# Patient Record
Sex: MALE | Race: WHITE | NOT HISPANIC OR LATINO | URBAN - METROPOLITAN AREA
[De-identification: names, ages, dates, MRNs, and addresses within clinical notes are randomized per-mention and may not be internally consistent; named-entity substitution may affect disease eponyms.]

---

## 2017-12-06 ENCOUNTER — APPOINTMENT (OUTPATIENT)
Dept: PULMONOLOGY | Facility: HOSPICE | Age: 72
End: 2017-12-06
Payer: MEDICARE

## 2018-04-13 ENCOUNTER — HOSPITAL ENCOUNTER (OUTPATIENT)
Dept: RADIOLOGY | Facility: MEDICAL CENTER | Age: 73
End: 2018-04-13

## 2018-04-16 ENCOUNTER — OFFICE VISIT (OUTPATIENT)
Dept: PULMONOLOGY | Facility: HOSPICE | Age: 73
End: 2018-04-16
Payer: MEDICARE

## 2018-04-16 VITALS
RESPIRATION RATE: 16 BRPM | DIASTOLIC BLOOD PRESSURE: 82 MMHG | TEMPERATURE: 98.5 F | HEIGHT: 71 IN | OXYGEN SATURATION: 64 % | BODY MASS INDEX: 34.16 KG/M2 | HEART RATE: 91 BPM | WEIGHT: 244 LBS | SYSTOLIC BLOOD PRESSURE: 128 MMHG

## 2018-04-16 DIAGNOSIS — J45.41 MODERATE PERSISTENT ASTHMA WITH ACUTE EXACERBATION: ICD-10-CM

## 2018-04-16 DIAGNOSIS — I50.42 CHF (CONGESTIVE HEART FAILURE), NYHA CLASS I, CHRONIC, COMBINED (HCC): ICD-10-CM

## 2018-04-16 DIAGNOSIS — J43.2 CENTRILOBULAR EMPHYSEMA (HCC): ICD-10-CM

## 2018-04-16 PROCEDURE — 99204 OFFICE O/P NEW MOD 45 MIN: CPT | Mod: 25 | Performed by: INTERNAL MEDICINE

## 2018-04-16 PROCEDURE — G0009 ADMIN PNEUMOCOCCAL VACCINE: HCPCS | Performed by: INTERNAL MEDICINE

## 2018-04-16 PROCEDURE — 94761 N-INVAS EAR/PLS OXIMETRY MLT: CPT | Performed by: INTERNAL MEDICINE

## 2018-04-16 PROCEDURE — 90670 PCV13 VACCINE IM: CPT | Performed by: INTERNAL MEDICINE

## 2018-04-16 RX ORDER — FLUTICASONE PROPIONATE AND SALMETEROL XINAFOATE 115; 21 UG/1; UG/1
2 AEROSOL, METERED RESPIRATORY (INHALATION) 2 TIMES DAILY
Qty: 1 INHALER | Refills: 5 | Status: SHIPPED | OUTPATIENT
Start: 2018-04-16 | End: 2018-11-16 | Stop reason: SDUPTHER

## 2018-04-16 RX ORDER — AMOXICILLIN 500 MG/1
CAPSULE ORAL
COMMUNITY

## 2018-04-16 RX ORDER — CARVEDILOL 12.5 MG/1
12.5 TABLET ORAL 2 TIMES DAILY WITH MEALS
COMMUNITY

## 2018-04-16 RX ORDER — BENAZEPRIL HYDROCHLORIDE 40 MG/1
40 TABLET ORAL DAILY
COMMUNITY

## 2018-04-16 RX ORDER — PIOGLITAZONEHYDROCHLORIDE 15 MG/1
15 TABLET ORAL DAILY
COMMUNITY
Start: 2018-02-19

## 2018-04-16 RX ORDER — ALBUTEROL SULFATE 90 UG/1
AEROSOL, METERED RESPIRATORY (INHALATION)
COMMUNITY
Start: 2018-04-04 | End: 2018-07-16 | Stop reason: SDUPTHER

## 2018-04-16 RX ORDER — FUROSEMIDE 20 MG/1
20 TABLET ORAL DAILY
COMMUNITY
Start: 2018-03-26

## 2018-04-16 RX ORDER — PRAVASTATIN SODIUM 40 MG
40 TABLET ORAL DAILY
COMMUNITY
Start: 2018-02-19

## 2018-04-16 NOTE — PATIENT INSTRUCTIONS
This gentleman comes in for evaluation of significant COPD with underlying heart failure. He is sent by his cardiologist and his primary care doctor.    He lives in [place], tells me that he has daily sputum production, very likely chronic bronchitis. He stopped smoking 14 years ago after accumulating more than a 78-[id number] history.    He presently uses Ventolin, I have added Advair 1:15, 2 puffs in the morning 2 puffs at night and instructed him to rinse and gargle after use. Very thick sputum is not purulent or bloody, he may benefit from a mucus thinner like Mucinex.    He has low oxygen levels, 64% on arrival, we are doing a multi ox today to qualify him for supplemental oxygen.    In addition to providing supplemental oxygen and qualifying, we did discuss pulmonary rehabilitation, but with his heart failure, ankle edema, and living in [place] I don't think he is going to be able to come to town frequently enough to do the rehabilitation and he declines at this time. A chest x-ray November was clear.    He will need Advair maintenance, Ventolin rescue, mucus thinner, gradual improvement in exercise tolerance with supplemental oxygen, room air saturation was 87%, saturation was as low as 64%. He did improve to 90-94% on 2 L walking. We will see him back in 6-8 weeks to determine the benefit of supplemental oxygen 24 7, he may need a portable concentrator, the addition of Advair, and we will administer Prevnar today. Flu vaccine was given last year. Elevated body mass index will be addressed with portion control and very gentle exercise with supplemental oxygen. Health maintenance issues reviewed.

## 2018-04-16 NOTE — PROCEDURES
Multi-Ox Readings  Multi Ox #1 Room air   O2 sat % at rest 87   O2 sat % on exertion     O2 sat average on exertion     Multi Ox #2 2 LPM   O2 sat % at rest 94   O2 sat % on exertion 90   O2 sat average on exertion 92     Oxygen Use     Oxygen Frequency     Duration of need     Is the patient mobile within the home?     CPAP Use?     BIPAP Use?     Servo Titration

## 2018-04-16 NOTE — PROGRESS NOTES
Memo Santos is a 73 y.o. male here for COPD with chronic bronchitis and significant hypoxemia. Patient was referred by his primary care doctor.    History of Present Illness:      This gentleman comes in for evaluation of significant COPD with underlying heart failure. He is sent by his cardiologist and his primary care doctor.    He lives in [place], tells me that he has daily sputum production, very likely chronic bronchitis. He stopped smoking 14 years ago after accumulating more than a [id number]-[id number] history.    He presently uses Ventolin, I have added Advair 1:15, 2 puffs in the morning 2 puffs at night and instructed him to rinse and gargle after use. Very thick sputum is not purulent or bloody, he may benefit from a mucus thinner like Mucinex.    He has low oxygen levels, 64% on arrival, we are doing a multi ox today to qualify him for supplemental oxygen.    In addition to providing supplemental oxygen and qualifying, we did discuss pulmonary rehabilitation, but with his heart failure, ankle edema, and living in [place] I don't think he is going to be able to come to town frequently enough to do the rehabilitation and he declines at this time. A chest x-ray November was clear.    He will need Advair maintenance, Ventolin rescue, mucus thinner, gradual improvement in exercise tolerance with supplemental oxygen, room air saturation was 87%, saturation was as low as 64%. He did improve to 90-94% on 2 L walking. We will see him back in 6-8 weeks to determine the benefit of supplemental oxygen 24 7, he may need a portable concentrator, the addition of Advair, and we will administer Prevnar today. Flu vaccine was given last year. Elevated body mass index will be addressed with portion control and very gentle exercise with supplemental oxygen. Health maintenance issues reviewed.    Constitutional ROS: No unexpected change in weight, No unexplained fevers  Eyes: No change in vision or blurring or double  vision  Mouth/Throat ROS: No sore throat, No recent change in voice or hoarseness  Pulmonary ROS: See present history for pertinent positives  Cardiovascular ROS: No chest pain to suggest acute coronary syndrome  Gastrointestinal ROS: No abdominal pain to suggest peptic disease  Musculoskeletal/Extremities ROS: no acute artritis or unusual swelling  Hematologic/Lymphatic ROS: No easy bleeding or unusual lymph node swelling  Neurologic ROS: No new or unusual weakness  Psychiatric ROS: No hallucinations  Allergic/Immunologic: No  urticaria or allergic rash      Current Outpatient Prescriptions   Medication Sig Dispense Refill   • VENTOLIN  (90 Base) MCG/ACT Aero Soln inhalation aerosol      • furosemide (LASIX) 20 MG Tab      • metformin (GLUCOPHAGE) 1000 MG tablet      • pioglitazone (ACTOS) 15 MG Tab      • pravastatin (PRAVACHOL) 40 MG tablet      • benazepril (LOTENSIN) 40 MG tablet Take 40 mg by mouth every day.     • carvedilol (COREG) 12.5 MG Tab Take 12.5 mg by mouth 2 times a day, with meals.     • Multiple Vitamins-Minerals (OCUVITE EXTRA PO) Take  by mouth.     • fluticasone-salmeterol (ADVAIR HFA) 115-21 MCG/ACT inhaler Inhale 2 Puffs by mouth 2 times a day. Inhalation with spacer. Rinse mouth after each use. 1 Inhaler 5   • amoxicillin (AMOXIL) 500 MG Cap        No current facility-administered medications for this visit.        Social History   Substance Use Topics   • Smoking status: Former Smoker     Packs/day: 2.00     Types: Cigarettes     Quit date: 2004   • Smokeless tobacco: Never Used   • Alcohol use Yes      Comment: very rarely        Past Medical History:   Diagnosis Date   • Bronchitis    • Cardiac arrhythmia    • Chickenpox    • COPD (chronic obstructive pulmonary disease) (CMS-[facility])    • Cough    • Diabetes (CMS-[facility])    • Double vision    • Frequent urination    • [place] measles    • Hearing difficulty    • Hypertension    • Kidney stone    • Mumps    • Obesity    • Prostate cancer  "(CMS-[facility])    • Pulmonary emphysema (CMS-[facility])    • Ringing in ears    • Shortness of breath    • Sore muscles    • Swelling of lower extremity    • Vision loss    • Wheezing        Past Surgical History:   Procedure Laterality Date   • PB REMV 2ND CATARACT,CORN-SCLER SECTN     • TONSILLECTOMY         Allergies: Patient has no allergy information on record.    Family History   Problem Relation Age of Onset   • Heart Failure Mother    • Kidney Disease Father        Physical Examination    Vitals:    04/16/18 1413   Height: 1.791 m (5' 10.5\")   Weight: 110.7 kg (244 lb)   Weight % change since last entry.: 0 %   BP: 128/82   Pulse: 91   BMI (Calculated): 34.52   Resp: 16   Temp: 36.9 °C (98.5 °F)       General Appearance: alert, no distress  Skin: Skin color, texture, turgor normal. No rashes or lesions.  Eyes: negative  Oropharynx: Lips, mucosa, and tongue normal. Teeth and gums normal. Oropharynx moist and without lesion  Lungs: positive findings: Scattered rhonchi with wheezes and expiratory prolongation  Heart: negative. RRR without murmur, gallop, or rubs.  No ectopy.  Abdomen: Abdomen soft, non-tender. . No masses,  No organomegaly  Extremities:  3+ pretibial edema,  skin discoloration  Joints: No acute arthritis  Peripheral Pulses:perfused  Neurologic: intact grossly  No cervical adenopathy    II (soft palate, uvula, fauces visible)    Imaging: Chest x-ray November was clear    PFTS: Lung function testing previously showed mid flows at 13% predicted, flow volume loop consistent with very severe obstruction as was done September 2017      Assessment and Plan  1. Centrilobular emphysema (CMS-[facility])  - AMB MULTIPLE OXIMETRY; Future  - PNEUMOCOCCAL CONJUGATE VACCINE 13-VALENT  - AMB MULTIPLE OXIMETRY    2. Moderate persistent asthma with acute exacerbation  - AMB MULTIPLE OXIMETRY; Future  - AMB MULTIPLE OXIMETRY    3. BMI 34.0-34.9,adult    4. CHF (congestive heart failure), NYHA class I, chronic, combined " (CMS-[facility])      This gentleman comes in for evaluation of significant COPD with underlying heart failure. He is sent by his cardiologist and his primary care doctor.    He lives in [place], tells me that he has daily sputum production, very likely chronic bronchitis. He stopped smoking 14 years ago after accumulating more than a 10-vshi-fhly history.    He presently uses Ventolin, I have added Advair 1:15, 2 puffs in the morning 2 puffs at night and instructed him to rinse and gargle after use. Very thick sputum is not purulent or bloody, he may benefit from a mucus thinner like Mucinex.    He has low oxygen levels, 64% on arrival, we are doing a multi ox today to qualify him for supplemental oxygen.    In addition to providing supplemental oxygen and qualifying, we did discuss pulmonary rehabilitation, but with his heart failure, ankle edema, and living in [place] I don't think he is going to be able to come to town frequently enough to do the rehabilitation and he declines at this time. A chest x-ray November was clear.    He will need Advair maintenance, Ventolin rescue, mucus thinner, gradual improvement in exercise tolerance with supplemental oxygen, room air saturation was 87%, saturation was as low as 64%. He did improve to 90-94% on 2 L walking. We will see him back in 6-8 weeks to determine the benefit of supplemental oxygen 24 7, he may need a portable concentrator, the addition of Advair, and we will administer Prevnar today. Flu vaccine was given last year. Elevated body mass index will be addressed with portion control and very gentle exercise with supplemental oxygen. Health maintenance issues reviewed.  Followup Return in about 6 weeks (around 5/28/2018) for follow up visit with Dr. Livier Cardoso.

## 2018-07-16 ENCOUNTER — OFFICE VISIT (OUTPATIENT)
Dept: PULMONOLOGY | Facility: HOSPICE | Age: 73
End: 2018-07-16
Payer: MEDICARE

## 2018-07-16 VITALS
DIASTOLIC BLOOD PRESSURE: 70 MMHG | TEMPERATURE: 98.6 F | SYSTOLIC BLOOD PRESSURE: 130 MMHG | RESPIRATION RATE: 16 BRPM | HEIGHT: 70 IN | BODY MASS INDEX: 34.93 KG/M2 | HEART RATE: 70 BPM | OXYGEN SATURATION: 91 % | WEIGHT: 244 LBS

## 2018-07-16 DIAGNOSIS — J45.41 MODERATE PERSISTENT ASTHMA WITH ACUTE EXACERBATION: ICD-10-CM

## 2018-07-16 DIAGNOSIS — J43.2 CENTRILOBULAR EMPHYSEMA (HCC): ICD-10-CM

## 2018-07-16 DIAGNOSIS — I50.42 CHF (CONGESTIVE HEART FAILURE), NYHA CLASS I, CHRONIC, COMBINED (HCC): ICD-10-CM

## 2018-07-16 DIAGNOSIS — R09.02 HYPOXIA: ICD-10-CM

## 2018-07-16 PROCEDURE — 99214 OFFICE O/P EST MOD 30 MIN: CPT | Performed by: INTERNAL MEDICINE

## 2018-07-16 RX ORDER — ALBUTEROL SULFATE 90 UG/1
2 AEROSOL, METERED RESPIRATORY (INHALATION) 4 TIMES DAILY
Qty: 8.5 G | Refills: 2 | Status: SHIPPED | OUTPATIENT
Start: 2018-07-16 | End: 2019-01-18 | Stop reason: SDUPTHER

## 2018-07-16 NOTE — LETTER
Livier Cardoso M.D.  [facility]   [address] [place] [address] [place] [address]  Phone: [phone] - Fax: [phone]           Encounter Date: 7/16/2018  Provider: Livier Cardoso M.D.  Location of Care: [facility]      Patient:   Memo Santos   MR Number: [id number]   YOB: 1945     PROGRESS NOTE:  Memo Santos is a 73 y.o. male here for COPD on oxygen with reactive airway disease and elevated body mass index. Patient was referred by his primary care doctor.    History of Present Illness:      This gentleman lives in [place], has had a significant improvement primarily with the oxygen.  3 L pulse with exercise is being utilized and this is strongly endorsed.  He uses it generally 24 7, supine and at night would be very important given his elevated body mass index.  Portion control and gentle exercise are again discussed.    His reactive airway disease is fairly well-controlled but he was using Advair intermittently, I suggested 2 puffs morning 2 puffs night as maintenance, with Ventolin or albuterol or Pro Air rescue inhaler 2 puffs 4-6 times per day as needed for coughing wheezing or chest tightness.  This is all reviewed.    Immunizations are reviewed, he will need Pneumovax at the next visit, flu vaccine in the fall, otherwise is current.  Body mass index at 35, addressed.  Not smoking, endorsed.  We will recheck him in 6 months sooner for problems    Constitutional ROS: No unexpected change in weight, No unexplained fevers  Eyes: No change in vision or blurring or double vision  Mouth/Throat ROS: No sore throat, No recent change in voice or hoarseness  Pulmonary ROS: See present history for pertinent positives  Cardiovascular ROS: No chest pain to suggest acute coronary syndrome  Gastrointestinal ROS: No abdominal pain to suggest peptic disease  Musculoskeletal/Extremities ROS: no acute artritis or unusual  swelling  Hematologic/Lymphatic ROS: No easy bleeding or unusual lymph node swelling  Neurologic ROS: No new or unusual weakness  Psychiatric ROS: No hallucinations  Allergic/Immunologic: No  urticaria or allergic rash      Current Outpatient Prescriptions   Medication Sig Dispense Refill   • Diphenhydramine-APAP, sleep, (TYLENOL PM EXTRA STRENGTH)  MG/30ML Liquid Take  by mouth.     • VENTOLIN  (90 Base) MCG/ACT Aero Soln inhalation aerosol Inhale 2 Puffs by mouth 4 times a day. 8.5 g 2   • furosemide (LASIX) 20 MG Tab      • metformin (GLUCOPHAGE) 1000 MG tablet      • pioglitazone (ACTOS) 15 MG Tab      • pravastatin (PRAVACHOL) 40 MG tablet      • benazepril (LOTENSIN) 40 MG tablet Take 40 mg by mouth every day.     • carvedilol (COREG) 12.5 MG Tab Take 12.5 mg by mouth 2 times a day, with meals.     • Multiple Vitamins-Minerals (OCUVITE EXTRA PO) Take  by mouth.     • amoxicillin (AMOXIL) 500 MG Cap      • fluticasone-salmeterol (ADVAIR HFA) 115-21 MCG/ACT inhaler Inhale 2 Puffs by mouth 2 times a day. Inhalation with spacer. Rinse mouth after each use. 1 Inhaler 5     No current facility-administered medications for this visit.        Social History   Substance Use Topics   • Smoking status: Former Smoker     Packs/day: 2.00     Types: Cigarettes     Quit date: 2004   • Smokeless tobacco: Never Used   • Alcohol use Yes      Comment: very rarely        Past Medical History:   Diagnosis Date   • Bronchitis    • Cardiac arrhythmia    • Chickenpox    • COPD (chronic obstructive pulmonary disease) (HCC)    • Cough    • Diabetes (HCC)    • Double vision    • Frequent urination    • [place] measles    • Hearing difficulty    • Hypertension    • Kidney stone    • Mumps    • Obesity    • Prostate cancer (HCC)    • Pulmonary emphysema (HCC)    • Ringing in ears    • Shortness of breath    • Sore muscles    • Swelling of lower extremity    • Vision loss    • Wheezing        Past Surgical History:   Procedure  "Laterality Date   • PB REMV 2ND CATARACT,CORN-SCLER SECTN     • TONSILLECTOMY         Allergies: Patient has no allergy information on record.    Family History   Problem Relation Age of Onset   • Heart Failure Mother    • Kidney Disease Father        Physical Examination    Vitals:    07/16/18 1004   Height: 1.778 m (5' 10\")   Weight: 110.7 kg (244 lb)   Weight % change since last entry.: 0 %   BP: 130/70   Pulse: 70   BMI (Calculated): 35.01   Resp: 16   Temp: 37 °C (98.6 °F)   O2 sat % on O2: 91 %   O2 Flow Rate (L/min): 2       General Appearance: alert, no distress  Skin: Skin color, texture, turgor normal. No rashes or lesions.  Eyes: negative  Oropharynx: Lips, mucosa, and tongue normal. Teeth and gums normal. Oropharynx moist and without lesion  Lungs: positive findings: Remarkably clear although diminished breath sounds and expiratory wheeze on forced maneuver  Heart: negative. RRR without murmur, gallop, or rubs.  No ectopy.  Abdomen: Abdomen soft, non-tender. . No masses,  No organomegaly  Extremities:  No deformities, does have lower extremity left greater than right chronic trace edema, or skin discoloration  Joints: No acute arthritis  Peripheral Pulses:perfused  Neurologic: intact grossly  On oxygen looks comfortable    II (soft palate, uvula, fauces visible)    Imaging: None presently    PFTS: Prior reviewed      Assessment and Plan  1. Centrilobular emphysema (HCC)    2. Moderate persistent asthma with acute exacerbation    3. BMI 34.0-34.9,adult  - OBESITY COUNSELING (No Charge): Patient identified as having weight management issue.  Appropriate orders and counseling given.    4. CHF (congestive heart failure), NYHA class I, chronic, combined (HCC)    5. Hypoxia  On oxygen 24 7 with 3 L pulsed for exercise      This gentleman lives in [place], has had a significant improvement primarily with the oxygen.  3 L pulse with exercise is being utilized and this is strongly endorsed.  He uses it generally " 24 7, supine and at night would be very important given his elevated body mass index.  Portion control and gentle exercise are again discussed.    His reactive airway disease is fairly well-controlled but he was using Advair intermittently, I suggested 2 puffs morning 2 puffs night as maintenance, with Ventolin or albuterol or Pro Air rescue inhaler 2 puffs 4-6 times per day as needed for coughing wheezing or chest tightness.  This is all reviewed.    Immunizations are reviewed, he will need Pneumovax at the next visit, flu vaccine in the fall, otherwise is current.  Body mass index at 35, addressed.  Not smoking, endorsed.  We will recheck him in 6 months sooner for problems     Return in about 6 months (around 1/16/2019).        Electronically signed by Livier Cardoso M.D.  on 07/16/18    Belkys Hyman M.D.  [address] [place] [id number]  VIA Facsimile: [phone]

## 2018-07-16 NOTE — PROGRESS NOTES
Memo Santos is a 73 y.o. male here for COPD on oxygen with reactive airway disease and elevated body mass index. Patient was referred by his primary care doctor.    History of Present Illness:      This gentleman lives in [place], has had a significant improvement primarily with the oxygen.  3 L pulse with exercise is being utilized and this is strongly endorsed.  He uses it generally 24 7, supine and at night would be very important given his elevated body mass index.  Portion control and gentle exercise are again discussed.    His reactive airway disease is fairly well-controlled but he was using Advair intermittently, I suggested 2 puffs morning 2 puffs night as maintenance, with Ventolin or albuterol or Pro Air rescue inhaler 2 puffs 4-6 times per day as needed for coughing wheezing or chest tightness.  This is all reviewed.    Immunizations are reviewed, he will need Pneumovax at the next visit, flu vaccine in the fall, otherwise is current.  Body mass index at 35, addressed.  Not smoking, endorsed.  We will recheck him in 6 months sooner for problems    Constitutional ROS: No unexpected change in weight, No unexplained fevers  Eyes: No change in vision or blurring or double vision  Mouth/Throat ROS: No sore throat, No recent change in voice or hoarseness  Pulmonary ROS: See present history for pertinent positives  Cardiovascular ROS: No chest pain to suggest acute coronary syndrome  Gastrointestinal ROS: No abdominal pain to suggest peptic disease  Musculoskeletal/Extremities ROS: no acute artritis or unusual swelling  Hematologic/Lymphatic ROS: No easy bleeding or unusual lymph node swelling  Neurologic ROS: No new or unusual weakness  Psychiatric ROS: No hallucinations  Allergic/Immunologic: No  urticaria or allergic rash      Current Outpatient Prescriptions   Medication Sig Dispense Refill   • Diphenhydramine-APAP, sleep, (TYLENOL PM EXTRA STRENGTH)  MG/30ML Liquid Take  by mouth.     • VENTOLIN  " (90 Base) MCG/ACT Aero Soln inhalation aerosol Inhale 2 Puffs by mouth 4 times a day. 8.5 g 2   • furosemide (LASIX) 20 MG Tab      • metformin (GLUCOPHAGE) 1000 MG tablet      • pioglitazone (ACTOS) 15 MG Tab      • pravastatin (PRAVACHOL) 40 MG tablet      • benazepril (LOTENSIN) 40 MG tablet Take 40 mg by mouth every day.     • carvedilol (COREG) 12.5 MG Tab Take 12.5 mg by mouth 2 times a day, with meals.     • Multiple Vitamins-Minerals (OCUVITE EXTRA PO) Take  by mouth.     • amoxicillin (AMOXIL) 500 MG Cap      • fluticasone-salmeterol (ADVAIR HFA) 115-21 MCG/ACT inhaler Inhale 2 Puffs by mouth 2 times a day. Inhalation with spacer. Rinse mouth after each use. 1 Inhaler 5     No current facility-administered medications for this visit.        Social History   Substance Use Topics   • Smoking status: Former Smoker     Packs/day: 2.00     Types: Cigarettes     Quit date: 2004   • Smokeless tobacco: Never Used   • Alcohol use Yes      Comment: very rarely        Past Medical History:   Diagnosis Date   • Bronchitis    • Cardiac arrhythmia    • Chickenpox    • COPD (chronic obstructive pulmonary disease) (HCC)    • Cough    • Diabetes (HCC)    • Double vision    • Frequent urination    • Citizen of [place] measles    • Hearing difficulty    • Hypertension    • Kidney stone    • Mumps    • Obesity    • Prostate cancer (HCC)    • Pulmonary emphysema (HCC)    • Ringing in ears    • Shortness of breath    • Sore muscles    • Swelling of lower extremity    • Vision loss    • Wheezing        Past Surgical History:   Procedure Laterality Date   • PB REMV 2ND CATARACT,CORN-SCLER SECTN     • TONSILLECTOMY         Allergies: Patient has no allergy information on record.    Family History   Problem Relation Age of Onset   • Heart Failure Mother    • Kidney Disease Father        Physical Examination    Vitals:    07/16/18 1004   Height: 1.778 m (5' 10\")   Weight: 110.7 kg (244 lb)   Weight % change since last entry.: 0 %   BP: " 130/70   Pulse: 70   BMI (Calculated): 35.01   Resp: 16   Temp: 37 °C (98.6 °F)   O2 sat % on O2: 91 %   O2 Flow Rate (L/min): 2       General Appearance: alert, no distress  Skin: Skin color, texture, turgor normal. No rashes or lesions.  Eyes: negative  Oropharynx: Lips, mucosa, and tongue normal. Teeth and gums normal. Oropharynx moist and without lesion  Lungs: positive findings: Remarkably clear although diminished breath sounds and expiratory wheeze on forced maneuver  Heart: negative. RRR without murmur, gallop, or rubs.  No ectopy.  Abdomen: Abdomen soft, non-tender. . No masses,  No organomegaly  Extremities:  No deformities, does have lower extremity left greater than right chronic trace edema, or skin discoloration  Joints: No acute arthritis  Peripheral Pulses:perfused  Neurologic: intact grossly  On oxygen looks comfortable    II (soft palate, uvula, fauces visible)    Imaging: None presently    PFTS: Prior reviewed      Assessment and Plan  1. Centrilobular emphysema (HCC)    2. Moderate persistent asthma with acute exacerbation    3. BMI 34.0-34.9,adult  - OBESITY COUNSELING (No Charge): Patient identified as having weight management issue.  Appropriate orders and counseling given.    4. CHF (congestive heart failure), NYHA class I, chronic, combined (HCC)    5. Hypoxia  On oxygen 24 7 with 3 L pulsed for exercise      This gentleman lives in [place], has had a significant improvement primarily with the oxygen.  3 L pulse with exercise is being utilized and this is strongly endorsed.  He uses it generally 24 7, supine and at night would be very important given his elevated body mass index.  Portion control and gentle exercise are again discussed.    His reactive airway disease is fairly well-controlled but he was using Advair intermittently, I suggested 2 puffs morning 2 puffs night as maintenance, with Ventolin or albuterol or Pro Air rescue inhaler 2 puffs 4-6 times per day as needed for coughing  wheezing or chest tightness.  This is all reviewed.    Immunizations are reviewed, he will need Pneumovax at the next visit, flu vaccine in the fall, otherwise is current.  Body mass index at 35, addressed.  Not smoking, endorsed.  We will recheck him in 6 months sooner for problems     Return in about 6 months (around 1/16/2019).

## 2018-07-16 NOTE — PATIENT INSTRUCTIONS
This gentleman lives in [place], has had a significant improvement primarily with the oxygen.  3 L pulse with exercise is being utilized and this is strongly endorsed.  He uses it generally 24 7, supine and at night would be very important given his elevated body mass index.  Portion control and gentle exercise are again discussed.    His reactive airway disease is fairly well-controlled but he was using Advair intermittently, I suggested 2 puffs morning 2 puffs night as maintenance, with Ventolin or albuterol or Pro Air rescue inhaler 2 puffs 4-6 times per day as needed for coughing wheezing or chest tightness.  This is all reviewed.    Immunizations are reviewed, he will need Pneumovax at the next visit, flu vaccine in the fall, otherwise is current.  Body mass index at 35, addressed.  Not smoking, endorsed.  We will recheck him in 6 months sooner for problems

## 2018-11-12 ENCOUNTER — PATIENT MESSAGE (OUTPATIENT)
Dept: PULMONOLOGY | Facility: HOSPICE | Age: 73
End: 2018-11-12

## 2018-11-12 NOTE — TELEPHONE ENCOUNTER
From: Memo Santos  To: Livier Cardoso M.D.  Sent: 11/12/2018 11:43 AM PST  Subject: Prescription Question    This prescription (fluticasone-salmeterol 115-21 MCG/ACT inhaler) has never been ordered? What happened?

## 2018-11-16 NOTE — PATIENT COMMUNICATION
Have we ever prescribed this med? Yes.  If yes, what date? 4/16/18    Last OV: 7/16/18- Dr. Cardoso    Next OV: 1/18/19    DX: Moderate persistent asthma w/ acute exacerbation    Medications: Advair HFA

## 2018-11-20 RX ORDER — FLUTICASONE PROPIONATE AND SALMETEROL XINAFOATE 115; 21 UG/1; UG/1
2 AEROSOL, METERED RESPIRATORY (INHALATION) 2 TIMES DAILY
Qty: 3 INHALER | Refills: 3 | Status: SHIPPED | OUTPATIENT
Start: 2018-11-20 | End: 2018-12-04 | Stop reason: CLARIF

## 2018-12-03 ENCOUNTER — TELEPHONE (OUTPATIENT)
Dept: PULMONOLOGY | Facility: HOSPICE | Age: 73
End: 2018-12-03

## 2018-12-03 DIAGNOSIS — J45.40 MODERATE PERSISTENT ASTHMA WITHOUT COMPLICATION: ICD-10-CM

## 2018-12-03 NOTE — TELEPHONE ENCOUNTER
Thalia, Can you look into this? Pt saw Dr. Cardoso 7/16 and is pending follow up with ROXANNA Rivera 1/18/2019. Which inhaler is cheapest?

## 2018-12-03 NOTE — TELEPHONE ENCOUNTER
Patricia    [facility] pharmacy is requesting the Alternative Symbicort (copay $146.96) or Breo (copay $153.16) in place of the Advair /21 (11/2018) which is non formulary and not covered by the insurance comp.  Please advise!

## 2018-12-04 RX ORDER — BUDESONIDE AND FORMOTEROL FUMARATE DIHYDRATE 160; 4.5 UG/1; UG/1
2 AEROSOL RESPIRATORY (INHALATION) 2 TIMES DAILY
Qty: 1 INHALER | Refills: 11 | Status: SHIPPED | OUTPATIENT
Start: 2018-12-04 | End: 2018-12-05 | Stop reason: SDUPTHER

## 2018-12-04 NOTE — TELEPHONE ENCOUNTER
Finally got a hold of someone that could help at [facility]. Advair is not covered.  Breo is around 68.00 and Symbicort is around 57.00, both are covered at a tier 3.

## 2018-12-05 RX ORDER — BUDESONIDE AND FORMOTEROL FUMARATE DIHYDRATE 160; 4.5 UG/1; UG/1
2 AEROSOL RESPIRATORY (INHALATION) 2 TIMES DAILY
Qty: 3 INHALER | Refills: 3 | Status: SHIPPED | OUTPATIENT
Start: 2018-12-05 | End: 2019-01-18

## 2018-12-05 NOTE — TELEPHONE ENCOUNTER
Patricia,     Please resend rx Symbicort 160 to [employer] for a 90 day supply.  TY!    Moderate persistent asthma without complication (J45.40)

## 2019-01-18 ENCOUNTER — OFFICE VISIT (OUTPATIENT)
Dept: PULMONOLOGY | Facility: HOSPICE | Age: 74
End: 2019-01-18
Payer: MEDICARE

## 2019-01-18 VITALS
SYSTOLIC BLOOD PRESSURE: 134 MMHG | HEIGHT: 70 IN | RESPIRATION RATE: 18 BRPM | BODY MASS INDEX: 35.79 KG/M2 | TEMPERATURE: 97.3 F | WEIGHT: 250 LBS | HEART RATE: 36 BPM | OXYGEN SATURATION: 93 % | DIASTOLIC BLOOD PRESSURE: 82 MMHG

## 2019-01-18 DIAGNOSIS — R06.02 SHORTNESS OF BREATH: ICD-10-CM

## 2019-01-18 DIAGNOSIS — J44.9 CHRONIC OBSTRUCTIVE PULMONARY DISEASE, UNSPECIFIED COPD TYPE (HCC): ICD-10-CM

## 2019-01-18 DIAGNOSIS — R09.02 HYPOXIA: ICD-10-CM

## 2019-01-18 PROCEDURE — G0009 ADMIN PNEUMOCOCCAL VACCINE: HCPCS | Performed by: NURSE PRACTITIONER

## 2019-01-18 PROCEDURE — 90732 PPSV23 VACC 2 YRS+ SUBQ/IM: CPT | Performed by: NURSE PRACTITIONER

## 2019-01-18 PROCEDURE — 99214 OFFICE O/P EST MOD 30 MIN: CPT | Mod: 25 | Performed by: NURSE PRACTITIONER

## 2019-01-18 RX ORDER — TIOTROPIUM BROMIDE 18 UG/1
CAPSULE ORAL; RESPIRATORY (INHALATION)
Qty: 90 CAP | Refills: 0 | Status: SHIPPED | OUTPATIENT
Start: 2019-01-18 | End: 2019-03-05 | Stop reason: SDUPTHER

## 2019-01-18 RX ORDER — ALBUTEROL SULFATE 90 UG/1
2 AEROSOL, METERED RESPIRATORY (INHALATION) EVERY 4 HOURS PRN
Qty: 1 INHALER | Refills: 1 | Status: SHIPPED | OUTPATIENT
Start: 2019-01-18

## 2019-01-18 NOTE — PATIENT INSTRUCTIONS
1) Continue oxygen at 2L  2) Pulmonary function test and 6 in walk in [place]   3) Start Trelelgy 1 puff, once a day with mouth rinse  4) Rescue inhaler (Ventolin) as needed for shortness of breath or wheezing  5) Light conditioning encouraged  6) Pulmonary rehab referral after PFTs results  7) Cat scan for further evaluation of progressive dyspnea - hx of smoking  8) Vaccines: Up to date with Pneumovax 23 today. Prevnar 13, flu.   9) Return in about 2 months (around 3/18/2019) for follow up with ROXANNA Junior, if not sooner, review of symptoms, 6 min walk, pulmonary function results.

## 2019-01-18 NOTE — PROGRESS NOTES
CC:  Here for f/u pulmonary issues as listed below    HPI:   Memo presents today for follow up for COPD. PMH  DM2, CHF.    Formal PFTs have not been completed.  Former smoker, 2 pack/day, quit in 2004.  Uses nicotine gum.  Chest x-ray from [facility] completed November 2017 showed no acute cardiopulmonary process.  Patient was using Symbicort 160-4.52 puffs twice a day but recently stopped 2 days ago because he does not feel it is subjectively beneficial.  He has been utilizing Ventolin every 4 hours because the instructions said so.  He is been having progressive shortness of breath over the last 2 years eliminating hobbies he used to enjoy.  He complains of a chronic cough producing white phlegm sometimes difficult to expectorate.  He has occasionally used Mucinex in the past.  He is utilizing 2 L of oxygen 24-7.  He admits he is not very motivated to be active.  BMI is 35.  He is interested in pulmonary rehab though.      Patient Active Problem List    Diagnosis Date Noted   • Chronic obstructive pulmonary disease (HCC) 01/18/2019   • Hypoxia 07/16/2018   • Centrilobular emphysema ([facility]) 04/16/2018   • Moderate persistent asthma with acute exacerbation 04/16/2018   • BMI 35.0-35.9,adult 04/16/2018   • CHF (congestive heart failure), NYHA class I, chronic, combined ([facility]) 04/16/2018       Past Medical History:   Diagnosis Date   • Bronchitis    • Cardiac arrhythmia    • Chickenpox    • COPD (chronic obstructive pulmonary disease) ([facility])    • Cough    • Diabetes ([facility])    • Double vision    • Frequent urination    • Greek measles    • Hearing difficulty    • Hypertension    • Kidney stone    • Mumps    • Obesity    • Prostate cancer ([facility])    • Pulmonary emphysema ([facility])    • Ringing in ears    • Shortness of breath    • Sore muscles    • Swelling of lower extremity    • Vision loss    • Wheezing        Past Surgical History:   Procedure Laterality Date   • PB REMV 2ND CATARACT,CORN-SCLER SECTN     •  TONSILLECTOMY         Family History   Problem Relation Age of Onset   • Heart Failure Mother    • Kidney Disease Father        Social History   Substance Use Topics   • Smoking status: Former Smoker     Packs/day: 2.00     Types: Cigarettes     Quit date: 2004   • Smokeless tobacco: Never Used   • Alcohol use Yes      Comment: very rarely       Current Outpatient Prescriptions   Medication Sig Dispense Refill   • nicotine polacrilex (NICORETTE) 2 MG Gum Take 2 mg by mouth as needed for Smoking Cessation.     • Fluticasone-Umeclidin-Vilant (TRELEGY ELLIPTA) 100-62.5-25 MCG/INH AEROSOL POWDER, BREATH ACTIVATED Inhale 1 Puff by mouth every day. 1 Each 11   • tiotropium (SPIRIVA HANDIHALER) 18 MCG Cap Use 1 capsule inhaled from handihaler every day. 90 Cap 0   • albuterol (VENTOLIN HFA) 108 (90 Base) MCG/ACT Aero Soln inhalation aerosol Inhale 2 Puffs by mouth every four hours as needed for Shortness of Breath. 1 Inhaler 1   • furosemide (LASIX) 20 MG Tab Take 20 mg by mouth every day.     • metformin (GLUCOPHAGE) 1000 MG tablet Take 2,000 mg by mouth 2 times a day.     • pioglitazone (ACTOS) 15 MG Tab Take 15 mg by mouth every day.     • pravastatin (PRAVACHOL) 40 MG tablet Take 40 mg by mouth every day.     • benazepril (LOTENSIN) 40 MG tablet Take 40 mg by mouth every day.     • carvedilol (COREG) 12.5 MG Tab Take 12.5 mg by mouth 2 times a day, with meals.     • Multiple Vitamins-Minerals (OCUVITE EXTRA PO) Take  by mouth.     • Diphenhydramine-APAP, sleep, (TYLENOL PM EXTRA STRENGTH)  MG/30ML Liquid Take  by mouth.     • amoxicillin (AMOXIL) 500 MG Cap        No current facility-administered medications for this visit.           Allergies: Patient has no known allergies.          ROS   Gen: Denies fever, chills, unintentional weight loss, fatigue, night sweats  E/N/T: Denies nasal congestion, ear pain  Resp:Denies  wheezing,  hemoptysis  CV: Denies chest pain, chest tightness, palpitations  Sleep:Denies  "morning headache  Neuro: Denies frequent headaches, weakness, dizziness  GI: Denies acid reflux, N/V  See HPI.  All other systems reviewed and negative          Vital signs for this encounter:  Vitals:    01/18/19 1000 01/18/19 1037   Height:  1.778 m (5' 10\")   Weight:  113.4 kg (250 lb)   Weight % change since last entry.:  0 %   BP:  134/82   Pulse:  (!) 36   BMI (Calculated):  35.87   Resp:  18   Temp:  36.3 °C (97.3 °F)   TempSrc:  Temporal   O2 sat % on O2: 93 %    O2 Flow Rate (L/min): 2                  Physical Exam:   Appearance: well developed, well nourished, no acute distress.   Eyes: PERRL, EOM intact, sclere white, conjunctiva moist.  Ears: no lesions or deformities.  Hearing: grossly intact.  Nose: no lesions or deformities.  Dentition: good dentition.   Oropharynx: tongue normal, posterior pharynx without erythema or exudate.  Neck: supple, trachea midline, no masses.  Respiratory effort: no intercostal retractions or use of accessory muscles.  Lung auscultation: Bilateral diminished   Heart auscultation: no murmur, rub, or gallop   Extremities: no cyanosis or edema.  Abdomen: soft, non-tender, no masses.  Gait and station: without difficulty   Digits and Nails: no clubbing, cyanosis, petechiae, or nodes.  Cranial nerves: grossly normal.  Motor: no focal deficits observed.   Skin: no rashes, lesions, or ulcers noted.  Orientation: oriented to time, place, and person.  Mood and affect: mood and affect appropriate, normal interaction with examiner.      Assessment   1. BMI 35.0-35.9,adult  OBESITY COUNSELING (No Charge): Patient identified as having weight management issue.  Appropriate orders and counseling given.   2. Chronic obstructive pulmonary disease, unspecified COPD type (HCC)  PULMONARY FUNCTION TESTS -Test requested: Complete Pulmonary Function Test    Pneumococal Polysaccharide Vaccine 23-Valent =>3YO SQ/IM    Exercise Test for Bronchospasm / 6-Minute Walk    CANCELED: PULMONARY FUNCTION " TESTS -Test requested: Complete Pulmonary Function Test   3. Hypoxia  Exercise Test for Bronchospasm / 6-Minute Walk    CANCELED: PULMONARY FUNCTION TESTS -Test requested: Complete Pulmonary Function Test   4. Shortness of breath  CT-CHEST (THORAX) W/O       Patient was seen for 35 minutes, more than 50% of time spent in face to face review, counseling, and arranging future evaluation and follow up of medical conditions and care relating to patient had many questions regarding his lung disease.  He made several comments regarding his lung disease becoming very frustrated he is unable to do things he used to enjoy.  He admits he is very stressed although I suspect clinically depressed and does not exercise or be very active because he becomes very dyspneic.  Did suggest talking with a therapist which has been beneficial in the past for history of alcoholism per patient.  He is a recovering addict he admits.  Patient is clinically stable and will have the following changes per plan.     PLAN:   Patient Instructions   1) Continue oxygen at 2L  2) Pulmonary function test and 6 in walk in [place]   3) Start Trelelgy 1 puff, once a day with mouth rinse - stop Symbicort  4) Rescue inhaler (Ventolin) as needed for shortness of breath or wheezing  5) Light conditioning encouraged  6) Pulmonary rehab referral after PFTs results  7) Cat scan for further evaluation of progressive dyspnea - hx of smoking  8) Vaccines: Up to date with Pneumovax 23 today. Prevnar 13, flu.   9) Return in about 2 months (around 3/18/2019) for follow up with ROXANNA Junior, if not sooner, review of symptoms, 6 min walk, pulmonary function results.

## 2019-01-21 ENCOUNTER — TELEPHONE (OUTPATIENT)
Dept: PULMONOLOGY | Facility: HOSPICE | Age: 74
End: 2019-01-21

## 2019-01-22 NOTE — TELEPHONE ENCOUNTER
Lesley from [facility] left  states pt schedule to complete Ct chest w/o contrast do have the order. Lesley states per pt would like to complete with [facility] requesting we fax orders to [phone].  [phone]  I contacted the pt confirmed scheduled to complete PFT and 6MW at [facility] and cancel testing at [facility]. Both orders for pft 60 and 6 MW faxed to [phone]

## 2019-01-28 ENCOUNTER — HOSPITAL ENCOUNTER (OUTPATIENT)
Dept: RADIOLOGY | Facility: MEDICAL CENTER | Age: 74
End: 2019-01-28

## 2019-03-05 DIAGNOSIS — J44.9 CHRONIC OBSTRUCTIVE PULMONARY DISEASE, UNSPECIFIED COPD TYPE (HCC): ICD-10-CM

## 2019-03-05 NOTE — TELEPHONE ENCOUNTER
Caller Name: Memo Johnson                 Call Back Number: [phone] (home)         Patient approves a detailed voicemail message: N\A    Have we ever prescribed this med? Yes.  If yes, what date? 1/18/19    Last OV: 1/18/19 ROXANNA Johns     Next OV: 3/22/19 ROXANNA Johns     DX: COPD     Medications:  Current Outpatient Prescriptions   Medication Sig Dispense Refill   • nicotine polacrilex (NICORETTE) 2 MG Gum Take 2 mg by mouth as needed for Smoking Cessation.     • Fluticasone-Umeclidin-Vilant (TRELEGY ELLIPTA) 100-62.5-25 MCG/INH AEROSOL POWDER, BREATH ACTIVATED Inhale 1 Puff by mouth every day. 1 Each 11   • tiotropium (SPIRIVA HANDIHALER) 18 MCG Cap Use 1 capsule inhaled from handihaler every day. 90 Cap 0   • albuterol (VENTOLIN HFA) 108 (90 Base) MCG/ACT Aero Soln inhalation aerosol Inhale 2 Puffs by mouth every four hours as needed for Shortness of Breath. 1 Inhaler 1   • Diphenhydramine-APAP, sleep, (TYLENOL PM EXTRA STRENGTH)  MG/30ML Liquid Take  by mouth.     • amoxicillin (AMOXIL) 500 MG Cap      • furosemide (LASIX) 20 MG Tab Take 20 mg by mouth every day.     • metformin (GLUCOPHAGE) 1000 MG tablet Take 2,000 mg by mouth 2 times a day.     • pioglitazone (ACTOS) 15 MG Tab Take 15 mg by mouth every day.     • pravastatin (PRAVACHOL) 40 MG tablet Take 40 mg by mouth every day.     • benazepril (LOTENSIN) 40 MG tablet Take 40 mg by mouth every day.     • carvedilol (COREG) 12.5 MG Tab Take 12.5 mg by mouth 2 times a day, with meals.     • Multiple Vitamins-Minerals (OCUVITE EXTRA PO) Take  by mouth.       No current facility-administered medications for this visit.

## 2019-03-05 NOTE — TELEPHONE ENCOUNTER
RX received VIA fax from   [facility] - [place], [place] - [address]  [address]  [facility] [address]  Phone: [phone] Fax: [phone]

## 2019-03-06 RX ORDER — TIOTROPIUM BROMIDE 18 UG/1
CAPSULE ORAL; RESPIRATORY (INHALATION)
Qty: 90 CAP | Refills: 3 | Status: SHIPPED | OUTPATIENT
Start: 2019-03-06 | End: 2020-01-23 | Stop reason: SDUPTHER

## 2019-03-22 ENCOUNTER — NON-PROVIDER VISIT (OUTPATIENT)
Dept: PULMONOLOGY | Facility: HOSPICE | Age: 74
End: 2019-03-22
Attending: NURSE PRACTITIONER
Payer: MEDICARE

## 2019-03-22 ENCOUNTER — OFFICE VISIT (OUTPATIENT)
Dept: PULMONOLOGY | Facility: HOSPICE | Age: 74
End: 2019-03-22
Payer: MEDICARE

## 2019-03-22 VITALS
OXYGEN SATURATION: 91 % | HEIGHT: 70 IN | SYSTOLIC BLOOD PRESSURE: 132 MMHG | RESPIRATION RATE: 16 BRPM | BODY MASS INDEX: 36.22 KG/M2 | WEIGHT: 253 LBS | HEART RATE: 40 BPM | DIASTOLIC BLOOD PRESSURE: 80 MMHG | TEMPERATURE: 97.9 F

## 2019-03-22 DIAGNOSIS — J43.2 CENTRILOBULAR EMPHYSEMA (HCC): ICD-10-CM

## 2019-03-22 DIAGNOSIS — J45.41 MODERATE PERSISTENT ASTHMA WITH ACUTE EXACERBATION: ICD-10-CM

## 2019-03-22 DIAGNOSIS — J44.9 CHRONIC OBSTRUCTIVE PULMONARY DISEASE, UNSPECIFIED COPD TYPE (HCC): ICD-10-CM

## 2019-03-22 DIAGNOSIS — R09.02 HYPOXIA: ICD-10-CM

## 2019-03-22 PROCEDURE — 99214 OFFICE O/P EST MOD 30 MIN: CPT | Performed by: NURSE PRACTITIONER

## 2019-03-22 PROCEDURE — 94060 EVALUATION OF WHEEZING: CPT | Performed by: INTERNAL MEDICINE

## 2019-03-22 PROCEDURE — 94726 PLETHYSMOGRAPHY LUNG VOLUMES: CPT | Performed by: INTERNAL MEDICINE

## 2019-03-22 PROCEDURE — 94729 DIFFUSING CAPACITY: CPT | Performed by: INTERNAL MEDICINE

## 2019-03-22 ASSESSMENT — PULMONARY FUNCTION TESTS
FEV1/FVC_PERCENT_CHANGE: 80
FVC: 2.95
FEV1/FVC_PREDICTED: 73
FVC: 3.12
FVC_PERCENT_PREDICTED: 73
FVC_PREDICTED: 4.23
FEV1/FVC_PERCENT_PREDICTED: 83
FEV1: 1.89
FEV1/FVC_PERCENT_PREDICTED: 72
FEV1/FVC_PERCENT_PREDICTED: 84
FEV1_PERCENT_CHANGE: 5
FEV1/FVC_PERCENT_LLN: 61
FEV1/FVC: 61
FEV1/FVC: 60.58
FEV1/FVC_PERCENT_PREDICTED: 82
FEV1/FVC: 60
FEV1_PREDICTED: 3.06
FVC_PERCENT_PREDICTED: 69
FEV1: 1.8
FEV1_LLN: 2.56
FEV1_PERCENT_CHANGE: 4
FEV1/FVC_PERCENT_CHANGE: 0
FEV1/FVC_PERCENT_PREDICTED: 84
FVC_LLN: 3.53
FEV1/FVC: 61
FEV1_PERCENT_PREDICTED: 61
FEV1_PERCENT_PREDICTED: 58

## 2019-03-22 NOTE — PROGRESS NOTES
CC:  Here for f/u pulmonary issues as listed below    HPI:   Memo presents today for follow up for emphysema with CAT scan, 6-minute walk, PFT results. PMH  DM2, CHF.     PFTs from 3/2019 indicate a Fev1 of 1.80L or 58% predicted with a mild bronchodilator response, Fev1/FVC ratio of 61, TLC 87%, DLCO 70%, DLVA 101%.  Former smoker, 2 pack/day, quit in 2004.  Uses nicotine gum.  6-minute walk and CAT scan were completed at [facility].  6-minute walk confirms he requires 24-7 oxygen at 2 L with a resting oxygenation at 87% on room air and went to a low of 84% after walking 3 minutes.  With 2 L of oxygen he desaturated to a low of 89%.CAT scan completed January 25, 2019 which I reviewed showed mild atelectatic changes at lung bases, right greater than left with a small right pleural effusion and right lower lobe pneumonia is suspected.  Patient has not been with ill symptoms.     Patient is using Trelegy and Spiriva together. Previously he used Symbicort 160-4.5, 2 puffs twice a day.  He has been utilizing Ventolin 1x/day.  He has had improved shortness of breath.  He complains of a chronic cough producing white phlegm sometimes difficult to expectorate.  He has occasionally used Mucinex in the past.  He is utilizing 2 L of oxygen 24-7.  He admits he is not very motivated to be active.  BMI is 35.  He is interested in pulmonary rehab though he lives near [facility].            Patient Active Problem List    Diagnosis Date Noted   • Chronic obstructive pulmonary disease ([facility]) 01/18/2019   • Hypoxia 07/16/2018   • Centrilobular emphysema ([facility]) 04/16/2018   • Moderate persistent asthma with acute exacerbation 04/16/2018   • BMI 36.0-36.9,adult 04/16/2018   • CHF (congestive heart failure), NYHA class I, chronic, combined ([facility]) 04/16/2018       Past Medical History:   Diagnosis Date   • Bronchitis    • Cardiac arrhythmia    • Chickenpox    • COPD (chronic obstructive pulmonary disease) ([facility])    • Cough     • Diabetes (HCC)    • Double vision    • Frequent urination    • Barb[place] measles    • Hearing difficulty    • Hypertension    • Kidney stone    • Mumps    • Obesity    • Prostate cancer (HCC)    • Pulmonary emphysema (HCC)    • Ringing in ears    • Shortness of breath    • Sore muscles    • Swelling of lower extremity    • Vision loss    • Wheezing        Past Surgical History:   Procedure Laterality Date   • PB REMV 2ND CATARACT,CORN-SCLER SECTN     • TONSILLECTOMY         Family History   Problem Relation Age of Onset   • Heart Failure Mother    • Kidney Disease Father        Social History   Substance Use Topics   • Smoking status: Former Smoker     Packs/day: 2.00     Years: 44.00     Types: Cigarettes     Quit date: 2004   • Smokeless tobacco: Never Used   • Alcohol use Yes      Comment: very rarely       Current Outpatient Prescriptions   Medication Sig Dispense Refill   • Pseudoephedrine-Guaifenesin (MUCINEX D PO) Take  by mouth.     • tiotropium (SPIRIVA HANDIHALER) 18 MCG Cap Use 1 capsule inhaled from handihaler every day. 90 Cap 3   • nicotine polacrilex (NICORETTE) 2 MG Gum Take 2 mg by mouth as needed for Smoking Cessation.     • Fluticasone-Umeclidin-Vilant (TRELEGY ELLIPTA) 100-62.5-25 MCG/INH AEROSOL POWDER, BREATH ACTIVATED Inhale 1 Puff by mouth every day. 1 Each 11   • Diphenhydramine-APAP, sleep, (TYLENOL PM EXTRA STRENGTH)  MG/30ML Liquid Take  by mouth.     • furosemide (LASIX) 20 MG Tab Take 20 mg by mouth every day.     • metformin (GLUCOPHAGE) 1000 MG tablet Take 2,000 mg by mouth 2 times a day.     • pioglitazone (ACTOS) 15 MG Tab Take 15 mg by mouth every day.     • pravastatin (PRAVACHOL) 40 MG tablet Take 40 mg by mouth every day.     • benazepril (LOTENSIN) 40 MG tablet Take 40 mg by mouth every day.     • carvedilol (COREG) 12.5 MG Tab Take 12.5 mg by mouth 2 times a day, with meals.     • Multiple Vitamins-Minerals (OCUVITE EXTRA PO) Take  by mouth.     • albuterol (VENTOLIN  "HFA) 108 (90 Base) MCG/ACT Aero Soln inhalation aerosol Inhale 2 Puffs by mouth every four hours as needed for Shortness of Breath. 1 Inhaler 1   • amoxicillin (AMOXIL) 500 MG Cap        No current facility-administered medications for this visit.           Allergies: Patient has no known allergies.          ROS   Gen: Denies fever, chills, unintentional weight loss, fatigue, night sweats  E/N/T: Denies nasal congestion, ear pain  Resp:Denies Dyspnea, wheezing, cough, sputum production, hemoptysis  CV: Denies chest pain, chest tightness, palpitations  Sleep:Denies morning headache  Neuro: Denies frequent headaches, weakness, dizziness  GI: Denies acid reflux, N/V  See HPI.  All other systems reviewed and negative          Vital signs for this encounter:  Vitals:    03/22/19 1400 03/22/19 1457   Height:  1.778 m (5' 10\")   Weight:  114.8 kg (253 lb)   Weight % change since last entry.:  0 %   BP:  132/80   Pulse:  (!) 40   BMI (Calculated):  36.3   Resp:  16   Temp:  36.6 °C (97.9 °F)   TempSrc:  Temporal   O2 sat % room air: (!) 91 %                  Physical Exam:   Appearance: well developed, well nourished, no acute distress.   Eyes: PERRL, EOM intact, sclere white, conjunctiva moist.  Ears: no lesions or deformities.  Hearing: grossly intact.  Nose: no lesions or deformities.  Dentition: good dentition.   Oropharynx: tongue normal, posterior pharynx without erythema or exudate.  Neck: supple, trachea midline, no masses.  Respiratory effort: no intercostal retractions or use of accessory muscles.  Lung auscultation: Poor air movement  Heart auscultation: no murmur, rub, or gallop   Extremities: no cyanosis or edema.  Abdomen: soft, non-tender, no masses.  Gait and station: without difficulty   Digits and Nails: no clubbing, cyanosis, petechiae, or nodes.  Cranial nerves: grossly normal.  Motor: no focal deficits observed.   Skin: no rashes, lesions, or ulcers noted.  Orientation: oriented to time, place, and " person.  Mood and affect: mood and affect appropriate, normal interaction with examiner.      Assessment   1. Centrilobular emphysema (HCC)     2. Chronic obstructive pulmonary disease, unspecified COPD type (HCC)     3. Hypoxia     4. Moderate persistent asthma with acute exacerbation     5. BMI 36.0-36.9,adult  OBESITY COUNSELING (No Charge): Patient identified as having weight management issue.  Appropriate orders and counseling given.       Patient was seen for 30 minutes, more than 50% of time spent in face to face review, counseling, and arranging future evaluation and follow up of medical conditions and care relating to reviewing CAT scan 6-minute walk and PFTs in detail.  Patient understands the importance of regular exercise.  Provided pulmonary rehab Brochure he can do at home peer patient is clinically stable and will have the following changes per plan.     PLAN:   Patient Instructions   1) Continue oxygen at 2L  2) Stop temporarily Trelelgy 1 puff, once a day with mouth rinse -    - Start Symbicort 160/4.5, and Spiriva until it is gone, then back to Trelelgy  4) Rescue inhaler (Ventolin) as needed for shortness of breath or wheezing  5) Light conditioning encouraged  6) Pulmonary rehab brochure provided  7) Vaccines: Up to date with Pneumovax 23 today. Prevnar 13, flu.   8) Continue smoking cessation   9) Return in about 3 months (around 6/22/2019) for follow up with ROXANNA Junior, if not sooner, review of symptoms.

## 2019-03-22 NOTE — PATIENT INSTRUCTIONS
1) Continue oxygen at 2L  2) Stop temporarily Trelelgy 1 puff, once a day with mouth rinse -    - Start Symbicort 160/4.5, and Spiriva  4) Rescue inhaler (Ventolin) as needed for shortness of breath or wheezing  5) Light conditioning encouraged  6) Pulmonary rehab referral after PFTs results  7) Cat scan for further evaluation of progressive dyspnea - hx of smoking  8) Vaccines: Up to date with Pneumovax 23 today. Prevnar 13, flu.   9) Continue smoking cessation   10) Return in about 3 months (around 6/22/2019) for follow up with ROXANNA Junior, if not sooner, review of symptoms.

## 2019-03-22 NOTE — PROCEDURES
Good patient effort & cooperation.  The results of this test meet the ATS/ERS standards for acceptability and repeatability.  Predicted equations for Spirometry are N-Sobeida II, ITS for lung volumes, and [facility] for DLCO.  The DLCO was uncorrected for Hgb.  A bronchodilator of Ventolin HFA- 2puffs via spacer were administered.  DLCO was performed during dilation period.    Acceptable and reproducible  FEV1 1.8 L [58%], FVC 2.95 L [69%]  ratio 61%  Flow volume loops consistent with a flare obstruction  TLC 6.18 L [87%]  DLCO 16.54 mL's per minute millimeter mercury [70%]    Impression    Moderate obstruction no response to bronchodilator except for mid flow evidence of air trapping mild reduction in gas transfer consistent with patient's diagnosis of COPD

## 2019-07-19 ENCOUNTER — OFFICE VISIT (OUTPATIENT)
Dept: PULMONOLOGY | Facility: HOSPICE | Age: 74
End: 2019-07-19
Payer: MEDICARE

## 2019-07-19 VITALS
BODY MASS INDEX: 36.22 KG/M2 | DIASTOLIC BLOOD PRESSURE: 78 MMHG | OXYGEN SATURATION: 96 % | HEIGHT: 70 IN | SYSTOLIC BLOOD PRESSURE: 110 MMHG | HEART RATE: 67 BPM | RESPIRATION RATE: 16 BRPM | WEIGHT: 253 LBS

## 2019-07-19 DIAGNOSIS — R09.02 HYPOXIA: ICD-10-CM

## 2019-07-19 DIAGNOSIS — J43.2 CENTRILOBULAR EMPHYSEMA (HCC): ICD-10-CM

## 2019-07-19 DIAGNOSIS — J44.9 CHRONIC OBSTRUCTIVE PULMONARY DISEASE, UNSPECIFIED COPD TYPE (HCC): ICD-10-CM

## 2019-07-19 PROCEDURE — 99214 OFFICE O/P EST MOD 30 MIN: CPT | Performed by: NURSE PRACTITIONER

## 2019-07-19 NOTE — PROGRESS NOTES
CC:  Here for f/u pulmonary issues as listed below    HPI:   Memo presents today for follow up for emphysema. PMH  DM2, CHF, prostate CA.     PFTs from 3/2019 indicate a Fev1 of 1.80L or 58% predicted with a mild bronchodilator response, Fev1/FVC ratio of 61, TLC 87%, DLCO 70%, DLVA 101%.  Former smoker, 2 pack/day, quit in 2004.  Uses nicotine gum.  6-minute walk and CAT scan were completed at [facility].  6-minute walk confirms he requires 24-7 oxygen.  CAT scan completed January 25, 2019 which I reviewed showed mild atelectatic changes at lung bases, right greater than left with a small right pleural effusion and right lower lobe pneumonia is suspected.  Patient has not been with ill symptoms.      Patient is using Trelegy daily x 1 week and previously was using Sym and Spiriva. He has not been utilizing Ventolin.  He has had improved shortness of breath, but with continued dyspnea with little activity.  He has improved chronic cough producing white phlegm sometimes difficult to expectorate.  He has occasionally used Mucinex in the past.  He is utilizing 2 L of oxygen 24-7.  He admits he is not very motivated to be active.  BMI is 36.  He is interested in pulmonary rehab though he lives near [facility].          Patient Active Problem List    Diagnosis Date Noted   • Chronic obstructive pulmonary disease ([facility]) 01/18/2019   • Hypoxia 07/16/2018   • Centrilobular emphysema ([facility]) 04/16/2018   • Moderate persistent asthma with acute exacerbation 04/16/2018   • BMI 36.0-36.9,adult 04/16/2018   • CHF (congestive heart failure), NYHA class I, chronic, combined ([facility]) 04/16/2018       Past Medical History:   Diagnosis Date   • Bronchitis    • Cardiac arrhythmia    • Chickenpox    • COPD (chronic obstructive pulmonary disease) ([facility])    • Cough    • Diabetes ([facility])    • Double vision    • Frequent urination    • Telugu measles    • Hearing difficulty    • Hypertension    • Kidney stone    • Mumps    •  Obesity    • Prostate cancer (HCC)    • Pulmonary emphysema (HCC)    • Ringing in ears    • Shortness of breath    • Sore muscles    • Swelling of lower extremity    • Vision loss    • Wheezing        Past Surgical History:   Procedure Laterality Date   • PB REMV 2ND CATARACT,CORN-SCLER SECTN     • TONSILLECTOMY         Family History   Problem Relation Age of Onset   • Heart Failure Mother    • Kidney Disease Father        Social History   Substance Use Topics   • Smoking status: Former Smoker     Packs/day: 2.00     Years: 44.00     Types: Cigarettes     Quit date: 2004   • Smokeless tobacco: Never Used   • Alcohol use Yes      Comment: very rarely       Current Outpatient Prescriptions   Medication Sig Dispense Refill   • Fluticasone-Umeclidin-Vilant (TRELEGY ELLIPTA) 100-62.5-25 MCG/INH AEROSOL POWDER, BREATH ACTIVATED Inhale 1 Puff by mouth every day. 1 Each 11   • Pseudoephedrine-Guaifenesin (MUCINEX D PO) Take  by mouth.     • nicotine polacrilex (NICORETTE) 2 MG Gum Take 2 mg by mouth as needed for Smoking Cessation.     • albuterol (VENTOLIN HFA) 108 (90 Base) MCG/ACT Aero Soln inhalation aerosol Inhale 2 Puffs by mouth every four hours as needed for Shortness of Breath. 1 Inhaler 1   • Diphenhydramine-APAP, sleep, (TYLENOL PM EXTRA STRENGTH)  MG/30ML Liquid Take  by mouth.     • furosemide (LASIX) 20 MG Tab Take 20 mg by mouth every day.     • metformin (GLUCOPHAGE) 1000 MG tablet Take 2,000 mg by mouth 2 times a day.     • pioglitazone (ACTOS) 15 MG Tab Take 15 mg by mouth every day.     • pravastatin (PRAVACHOL) 40 MG tablet Take 40 mg by mouth every day.     • benazepril (LOTENSIN) 40 MG tablet Take 40 mg by mouth every day.     • carvedilol (COREG) 12.5 MG Tab Take 12.5 mg by mouth 2 times a day, with meals.     • Multiple Vitamins-Minerals (OCUVITE EXTRA PO) Take  by mouth.     • tiotropium (SPIRIVA HANDIHALER) 18 MCG Cap Use 1 capsule inhaled from handihaler every day. (Patient not taking:  "Reported on 7/19/2019) 90 Cap 3   • amoxicillin (AMOXIL) 500 MG Cap        No current facility-administered medications for this visit.           Allergies: Patient has no known allergies.          ROS   Gen: Denies fever, chills, unintentional weight loss, fatigue, night sweats  E/N/T: Denies nasal congestion, ear pain  Resp:Denies hemoptysis  CV: Denies chest pain, chest tightness, palpitations  Sleep:Denies morning headache  Neuro: Denies frequent headaches, weakness, dizziness  GI: Denies acid reflux, N/V  See HPI.  All other systems reviewed and negative          Vital signs for this encounter:  Vitals:    07/19/19 1134   Height: 1.778 m (5' 10\")   Weight: 114.8 kg (253 lb)   Weight % change since last entry.: 0 %   BP: 110/78   Pulse: 67   BMI (Calculated): 36.3   Resp: 16                 Physical Exam:   Appearance: well developed, well nourished, no acute distress.   Eyes: PERRL, EOM intact, sclere white, conjunctiva moist.  Ears: no lesions or deformities.  Hearing: grossly intact.  Nose: no lesions or deformities.  Dentition: good dentition.   Oropharynx: tongue normal, posterior pharynx without erythema or exudate.  Neck: supple, trachea midline, no masses.  Respiratory effort: no intercostal retractions or use of accessory muscles.  Lung auscultation: Bilateral diminished   Heart auscultation: no murmur, rub, or gallop   Extremities: no cyanosis or edema.  Abdomen: soft, non-tender, no masses.  Gait and station: without difficulty   Digits and Nails: no clubbing, cyanosis, petechiae, or nodes.  Cranial nerves: grossly normal.  Motor: no focal deficits observed.   Skin: no rashes, lesions, or ulcers noted.  Orientation: oriented to time, place, and person.  Mood and affect: mood and affect appropriate, normal interaction with examiner.      Assessment   1. Hypoxia     2. Chronic obstructive pulmonary disease, unspecified COPD type (HCC)     3. Centrilobular emphysema (HCC)     4. BMI 36.0-36.9,adult  " OBESITY COUNSELING (No Charge): Patient identified as having weight management issue.  Appropriate orders and counseling given.       Patient was seen for 25 minutes, more than 50% of time spent in face to face review, counseling, and arranging future evaluation and follow up of medical conditions and care relating to medication management.  Reviewed in great detail that exercise is vital for his condition and to start slow, even 2 minutes of continuous exercise.  He continues to challenge and reports he can not do it. Patient is clinically stable and will have the following changes per plan.     PLAN:   Patient Instructions   1) Continue oxygen at 2L  2) Continue Trelelgy 1 puff, once a day with mouth rinse   3) Rescue inhaler (Ventolin) as needed for shortness of breath or wheezing  5) Light conditioning encouraged  6) Pulmonary rehab brochure provided  7) Vaccines: Up to date with Pneumovax, Prevnar 13, flu.   8) Continue smoking cessation   9) Return in about 6 months (around 1/19/2020) for if not sooner, review of symptoms.

## 2019-07-19 NOTE — PATIENT INSTRUCTIONS
1) Continue oxygen at 2L  2) Continue Trelelgy 1 puff, once a day with mouth rinse   3) Rescue inhaler (Ventolin) as needed for shortness of breath or wheezing  5) Light conditioning encouraged  6) Pulmonary rehab brochure provided  7) Vaccines: Up to date with Pneumovax, Prevnar 13, flu.   8) Continue smoking cessation   9) Return in about 6 months (around 1/19/2020) for if not sooner, review of symptoms.

## 2020-01-24 ENCOUNTER — APPOINTMENT (OUTPATIENT)
Dept: PULMONOLOGY | Facility: HOSPICE | Age: 75
End: 2020-01-24
Payer: MEDICARE